# Patient Record
Sex: FEMALE | Race: OTHER | HISPANIC OR LATINO | URBAN - METROPOLITAN AREA
[De-identification: names, ages, dates, MRNs, and addresses within clinical notes are randomized per-mention and may not be internally consistent; named-entity substitution may affect disease eponyms.]

---
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
## 2025-03-14 NOTE — ED PEDIATRIC NURSE REASSESSMENT NOTE - REASSESS COMMUNICATION
BP: 115/80 at goal, pt reports daily compliance with hctz but not with metoprolol. will continue to monitor. Pt expresses wishes to discontinue metoprolol. Discussed the importance of taking metoprolol due to hx of tachycardia and making f/u appt with cardiologist to discuss options. Pt reports being unsure about following up with cardiology at this time.   Medication plan/changes: continue hctz 25mg daily     
ED physician notified/family informed
ED physician notified
ED physician notified
ED physician notified/family informed
ED physician notified/family informed